# Patient Record
Sex: MALE | Race: ASIAN | NOT HISPANIC OR LATINO | URBAN - METROPOLITAN AREA
[De-identification: names, ages, dates, MRNs, and addresses within clinical notes are randomized per-mention and may not be internally consistent; named-entity substitution may affect disease eponyms.]

---

## 2023-02-17 ENCOUNTER — APPOINTMENT (RX ONLY)
Dept: URBAN - METROPOLITAN AREA CLINIC 323 | Facility: CLINIC | Age: 43
Setting detail: DERMATOLOGY
End: 2023-02-17

## 2023-02-17 PROBLEM — R22.9 LOCALIZED SWELLING, MASS AND LUMP, UNSPECIFIED: Status: ACTIVE | Noted: 2023-02-17

## 2023-02-17 PROCEDURE — 99202 OFFICE O/P NEW SF 15 MIN: CPT

## 2023-02-17 PROCEDURE — ? FULL BODY SKIN EXAM - DECLINED

## 2023-02-17 PROCEDURE — ? ADDITIONAL NOTES

## 2023-02-17 PROCEDURE — ? ORDER ULTRASOUND

## 2023-02-17 PROCEDURE — ? COUNSELING

## 2023-02-17 NOTE — PROCEDURE: ORDER ULTRASOUND
Provider: MARTÍN [place]
Ultrasound Protocol: Ultrasound of Subcutaneous Mass
Detail Level: Simple
Priority: normal

## 2023-02-17 NOTE — PROCEDURE: ADDITIONAL NOTES
Additional Notes: Rule out will be venous thrombosis. Briefly discussed excise with Dr jefferson
Render Risk Assessment In Note?: no
Detail Level: Simple